# Patient Record
Sex: MALE | Race: WHITE | Employment: FULL TIME | URBAN - METROPOLITAN AREA
[De-identification: names, ages, dates, MRNs, and addresses within clinical notes are randomized per-mention and may not be internally consistent; named-entity substitution may affect disease eponyms.]

---

## 2019-12-10 ENCOUNTER — TELEPHONE (OUTPATIENT)
Dept: FAMILY MEDICINE CLINIC | Age: 29
End: 2019-12-10

## 2019-12-11 ENCOUNTER — HOSPITAL ENCOUNTER (OUTPATIENT)
Age: 29
Discharge: HOME | End: 2019-12-11

## 2019-12-11 LAB — RUBV IGG SER QL: 199.3 IU/ML

## 2019-12-11 PROCEDURE — 86765 RUBEOLA ANTIBODY: CPT

## 2019-12-11 PROCEDURE — 86762 RUBELLA ANTIBODY: CPT

## 2019-12-11 PROCEDURE — 86787 VARICELLA-ZOSTER ANTIBODY: CPT

## 2019-12-11 PROCEDURE — 86735 MUMPS ANTIBODY: CPT

## 2019-12-11 PROCEDURE — 86481 TB AG RESPONSE T-CELL SUSP: CPT

## 2019-12-11 PROCEDURE — 36415 COLL VENOUS BLD VENIPUNCTURE: CPT

## 2019-12-13 LAB
MEASLES IMMUNE (IGG): 6.36
MUV IGG SER QL: 4.56
VZV IGG SER QL IA: 1.9

## 2019-12-16 LAB — T-SPOT TB TEST: NORMAL

## 2020-08-06 ENCOUNTER — APPOINTMENT (OUTPATIENT)
Dept: GENERAL RADIOLOGY | Age: 30
End: 2020-08-06
Payer: COMMERCIAL

## 2020-08-06 ENCOUNTER — HOSPITAL ENCOUNTER (EMERGENCY)
Age: 30
Discharge: HOME | End: 2020-08-06
Attending: EMERGENCY MEDICINE
Payer: COMMERCIAL

## 2020-08-06 VITALS
HEART RATE: 60 BPM | OXYGEN SATURATION: 100 % | RESPIRATION RATE: 15 BRPM | DIASTOLIC BLOOD PRESSURE: 87 MMHG | TEMPERATURE: 97.8 F | BODY MASS INDEX: 31.03 KG/M2 | WEIGHT: 220 LBS | SYSTOLIC BLOOD PRESSURE: 139 MMHG

## 2020-08-06 PROCEDURE — 73630 X-RAY EXAM OF FOOT: CPT

## 2020-08-06 PROCEDURE — 99283 EMERGENCY DEPT VISIT LOW MDM: CPT

## 2020-08-06 ASSESSMENT — ENCOUNTER SYMPTOMS
SHORTNESS OF BREATH: 0
NAUSEA: 0
COUGH: 0
VOMITING: 0

## 2020-08-06 ASSESSMENT — PAIN SCALES - GENERAL: PAINLEVEL_OUTOF10: 3

## 2020-08-06 ASSESSMENT — PAIN DESCRIPTION - PAIN TYPE: TYPE: ACUTE PAIN

## 2020-08-06 ASSESSMENT — PAIN DESCRIPTION - LOCATION: LOCATION: FOOT

## 2020-08-06 ASSESSMENT — PAIN DESCRIPTION - ORIENTATION: ORIENTATION: RIGHT

## 2020-08-06 ASSESSMENT — PAIN DESCRIPTION - DESCRIPTORS: DESCRIPTORS: ACHING;SORE

## 2020-08-06 NOTE — ED PROVIDER NOTES
[facility]     Emergency Department     Faculty Attestation        I performed a history and physical examination of the patient and discussed management with the resident. I reviewed the residents note and agree with the documented findings and plan of care. Any areas of disagreement are noted on the chart. I was personally present for the key portions of any procedures. I have documented in the chart those procedures where I was not present during the key portions. I have reviewed the emergency nurses triage note. I agree with the chief complaint, past medical history, past surgical history, allergies, medications, social and family history as documented unless otherwise noted below. For mid-level providers such as nurse practitioners as well as physicians assistants:    I have personally seen and evaluated the patient. I find the patient's history and physical exam are consistent with NP/PA documentation. I agree with the care provided, treatment rendered, disposition, & follow-up plan. Additional findings are as noted. Vital Signs: /87   Pulse 60   Temp 97.8 °F (36.6 °C) (Oral)   Resp 15   Wt 220 lb (99.8 kg)   SpO2 100%   BMI 31.03 kg/m²   PCP:  Ran Clifford MD (Inactive)    Pertinent Comments:     Isolated tenderness to the right foot after inverting it while stepping in a hole. There is no ankle tenderness.   There is no gross deformity patient has +2 DP PT pulses      Critical Care  None          Tere Fontenot MD  Attending Emergency Medicine Physician              Guadlupe Hodgkin, MD  08/06/20 105

## 2020-08-06 NOTE — ED PROVIDER NOTES
Active member of club or organization: Not on file     Attends meetings of clubs or organizations: Not on file     Relationship status: Not on file    Intimate partner violence     Fear of current or ex partner: Not on file     Emotionally abused: Not on file     Physically abused: Not on file     Forced sexual activity: Not on file   Other Topics Concern    Not on file   Social History Narrative    Not on file       History reviewed. No pertinent family history. Allergies:  Patient has no known allergies. Home Medications:  Prior to Admission medications    Medication Sig Start Date End Date Taking? Authorizing Provider   methylphenidate (CONCERTA) 36 MG CR tablet Take 1 tablet by mouth every morning. 5/9/13   Hailee Jay MD       REVIEW OF SYSTEMS    (2-9 systems for level 4, 10 or more for level 5)      Review of Systems   Constitutional: Negative for activity change, appetite change, chills and fever. Respiratory: Negative for cough and shortness of breath. Cardiovascular: Negative for chest pain. Gastrointestinal: Negative for nausea and vomiting. Musculoskeletal:        Right foot pain   Psychiatric/Behavioral: Negative for agitation, behavioral problems, confusion, self-injury and suicidal ideas. PHYSICAL EXAM   (up to 7 for level 4, 8 or more for level 5)      INITIAL VITALS:   /87   Pulse 60   Temp 97.8 °F (36.6 °C) (Oral)   Resp 15   Wt 220 lb (99.8 kg)   SpO2 100%   BMI 31.03 kg/m²     Physical Exam  Constitutional:       General: He is not in acute distress. Appearance: Normal appearance. He is not ill-appearing, toxic-appearing or diaphoretic. HENT:      Head: Normocephalic and atraumatic. Right Ear: External ear normal.      Left Ear: External ear normal.      Nose: Nose normal.      Mouth/Throat:      Mouth: Mucous membranes are moist.   Cardiovascular:      Rate and Rhythm: Normal rate. Pulses: Normal pulses.    Pulmonary:      Comments: Breathing comfortably on room air, symmetrical chest rise, speaking in full sentences  Musculoskeletal:      Comments: Patient has mild swelling over the dorsal aspect of the foot, with minimal tenderness palpation over the metatarsals. Full range of motion, good capillary refill, good DP and TP pulses   Skin:     General: Skin is warm. Neurological:      General: No focal deficit present. Mental Status: He is alert and oriented to person, place, and time. Gait: Gait normal.   Psychiatric:         Mood and Affect: Mood normal.         DIFFERENTIAL  DIAGNOSIS     PLAN (LABS / IMAGING / EKG):  Orders Placed This Encounter   Procedures    XR FOOT RIGHT (MIN 3 VIEWS)       MEDICATIONS ORDERED:  No orders of the defined types were placed in this encounter. DDX: Strain, sprain, fracture    DIAGNOSTIC RESULTS / EMERGENCY DEPARTMENT COURSE / MDM   :  No results found for this visit on 08/06/20. RADIOLOGY:  No acute osseous abnormality of the right foot    EKG  None    All EKG's are interpreted by the Emergency Department Physician who either signs or Co-signs this chart in the absence of a cardiologist.    EMERGENCY DEPARTMENT COURSE/IMPRESSION: 68-year-old male with no severe past medical history presenting complaining of pain in right foot after playing soccer yesterday, patient able to ambulate without difficulty, wearing flip flops no gross deformity, plain film unremarkable educated patient on symptomatic treatment as well as additional return precautions. PROCEDURES:  None    CONSULTS:  None    CRITICAL CARE:  None    FINAL IMPRESSION      1.  Foot pain, right          DISPOSITION / PLAN     DISPOSITION Decision To Discharge 08/06/2020 12:09:26 PM      PATIENT REFERRED TO:  MD Driss Marinmartín [address] [facility] [address] [place] [address]  [phone]    In 2 days  As needed, If symptoms worsen    [facility]  [address] [facility]  [phone]    As needed, If symptoms worsen      DISCHARGE MEDICATIONS:  Discharge Medication List as of 8/6/2020 11:25 AM          Evelyne Bear DO  Emergency Medicine Resident    (Please note that portions of thisnote were completed with a voice recognition program.  Efforts were made to edit the dictations but occasionally words are mis-transcribed.)     Evelyne Bear DO  Resident  08/06/20 0126

## 2020-08-06 NOTE — ED NOTES
Tavaresay arrives at [address] [facility], [address] [place] [address]  08/06/20 [id number]

## 2020-09-22 ENCOUNTER — EMPLOYEE WELLNESS (OUTPATIENT)
Dept: OTHER | Age: 30
End: 2020-09-22

## 2020-09-22 LAB
CHOLESTEROL/HDL RATIO: 4.5
CHOLESTEROL: 219 MG/DL
GLUCOSE BLD-MCNC: 95 MG/DL (ref 70–99)
HDLC SERPL-MCNC: 49 MG/DL
LDL CHOLESTEROL: 144 MG/DL (ref 0–130)
PATIENT FASTING?: YES
TRIGL SERPL-MCNC: 128 MG/DL
VLDLC SERPL CALC-MCNC: ABNORMAL MG/DL (ref 1–30)

## 2020-10-19 VITALS — BODY MASS INDEX: 31.74 KG/M2 | WEIGHT: 225 LBS

## 2021-09-16 LAB
CHOLESTEROL/HDL RATIO: 4.4
CHOLESTEROL: 200 MG/DL
GLUCOSE BLD-MCNC: 88 MG/DL (ref 70–99)
HDLC SERPL-MCNC: 45 MG/DL
LDL CHOLESTEROL: 127 MG/DL (ref 0–130)
PATIENT FASTING?: YES
TRIGL SERPL-MCNC: 142 MG/DL
VLDLC SERPL CALC-MCNC: ABNORMAL MG/DL (ref 1–30)

## 2022-02-22 ENCOUNTER — HOSPITAL ENCOUNTER (OUTPATIENT)
Age: 32
Setting detail: SPECIMEN
Discharge: HOME | End: 2022-02-22

## 2022-02-24 LAB
AZOOSPERMATIC CONFIRMATION: NORMAL
SEMEN VOLUME: 3 ML
SPERM CT, SMN: NORMAL MM3

## 2022-03-01 ENCOUNTER — HOSPITAL ENCOUNTER (OUTPATIENT)
Age: 32
Setting detail: SPECIMEN
Discharge: HOME | End: 2022-03-01

## 2022-03-02 LAB
AZOOSPERMATIC CONFIRMATION: NORMAL
SEMEN VOLUME: 1.5 ML
SPERM CT, SMN: NORMAL MM3

## 2022-11-08 ENCOUNTER — E-VISIT (OUTPATIENT)
Dept: PRIMARY CARE CLINIC | Age: 32
End: 2022-11-08
Payer: COMMERCIAL

## 2022-11-08 DIAGNOSIS — J01.90 ACUTE NON-RECURRENT SINUSITIS, UNSPECIFIED LOCATION: Primary | ICD-10-CM

## 2022-11-08 PROCEDURE — 99422 OL DIG E/M SVC 11-20 MIN: CPT | Performed by: NURSE PRACTITIONER

## 2022-11-08 RX ORDER — AMOXICILLIN AND CLAVULANATE POTASSIUM 875; 125 MG/1; MG/1
1 TABLET, FILM COATED ORAL 2 TIMES DAILY
Qty: 20 TABLET | Refills: 0 | Status: SHIPPED | OUTPATIENT
Start: 2022-11-08 | End: 2022-11-18

## 2022-11-08 ASSESSMENT — LIFESTYLE VARIABLES: SMOKING_STATUS: NO, I'VE NEVER SMOKED

## 2022-11-08 NOTE — PROGRESS NOTES
Jazmín Craven (1990) initiated an asynchronous digital communication through [employer] ( [employer] ). HPI: per patient questionnaire     Exam: not applicable    Diagnoses and all orders for this visit:  Diagnoses and all orders for this visit:    Acute non-recurrent sinusitis, unspecified location    Other orders  -     amoxicillin-clavulanate (AUGMENTIN) 875-125 MG per tablet; Take 1 tablet by mouth 2 times daily for 10 days    Supportive care measures provided  Recommended follow-up with PCP if symptoms do not improve    Time: EV2 - 11-20 minutes were spent on the digital evaluation and management of this patient.  17 min     Jevon Gallagher, APRN - CNP

## 2023-07-19 LAB
CHOLEST SERPL-MCNC: 182 MG/DL
CHOLESTEROL/HDL RATIO: 3.5
GLUCOSE SERPL-MCNC: 90 MG/DL (ref 70–99)
HDLC SERPL-MCNC: 52 MG/DL
LDLC SERPL CALC-MCNC: 119 MG/DL (ref 0–130)
PATIENT FASTING?: YES
TRIGL SERPL-MCNC: 53 MG/DL